# Patient Record
Sex: FEMALE | Race: OTHER | Employment: UNEMPLOYED | URBAN - METROPOLITAN AREA
[De-identification: names, ages, dates, MRNs, and addresses within clinical notes are randomized per-mention and may not be internally consistent; named-entity substitution may affect disease eponyms.]

---

## 2022-06-14 ENCOUNTER — LAB ENCOUNTER (OUTPATIENT)
Dept: LAB | Facility: HOSPITAL | Age: 22
End: 2022-06-14
Attending: NURSE PRACTITIONER
Payer: COMMERCIAL

## 2022-06-14 DIAGNOSIS — N94.6 DYSMENORRHEA: ICD-10-CM

## 2022-06-14 DIAGNOSIS — L68.0 FEMALE HIRSUTISM: ICD-10-CM

## 2022-06-14 LAB — TSI SER-ACNC: 2.45 MIU/ML (ref 0.36–3.74)

## 2022-06-14 PROCEDURE — 84403 ASSAY OF TOTAL TESTOSTERONE: CPT

## 2022-06-14 PROCEDURE — 84443 ASSAY THYROID STIM HORMONE: CPT

## 2022-06-14 PROCEDURE — 36415 COLL VENOUS BLD VENIPUNCTURE: CPT

## 2022-06-14 PROCEDURE — 84402 ASSAY OF FREE TESTOSTERONE: CPT

## 2022-06-18 LAB
TESTOSTERONE, FREE, S: 0.32 NG/DL
TESTOSTERONE, TOTAL, S: 23 NG/DL

## 2022-07-05 NOTE — PROCEDURES
IUD Insertion Procedure Note  TANYA Morgan    IUD Insertion:     Pregnancy Results: negative from urine test   Birth control method(s) used:  none  Pt's LMP was Patient's last menstrual period was 07/02/2022. Pt counseled on risks of IUD insertion including infection, PID/infertility, bleeding and uterine perforation. Discussed side effects of Kyleena IUD. Pt understands and has signed consent. Pelvic Exam Findings:  Cervix normal. Uterus anteverted. Procedure:  Speculum placed in the vagina. Betadine wash of vagina and cervix. Single tooth tenaculum was placed at the 12 o'clock position. Uterus sounded to cm. Cara Maggie IUD was placed without difficulty. Strings cut at 3 cm. Single tooth tenaculum removed. Silver nitrate used at tenaculum sites,hemostasis achieved. Patient tolerated procedure well. Visit Plan:  IUD surveillance was discussed with the patient. Pt to follow up in 4-6 weeks for IUD check.     TANYA Morgan  6:20 PM  7/5/2022

## 2022-08-03 ENCOUNTER — HOSPITAL ENCOUNTER (OUTPATIENT)
Dept: ULTRASOUND IMAGING | Facility: HOSPITAL | Age: 22
Discharge: HOME | End: 2022-08-03
Attending: NURSE PRACTITIONER
Payer: COMMERCIAL

## 2022-08-03 DIAGNOSIS — Z87.42 HISTORY OF OVARIAN CYST: ICD-10-CM

## 2022-08-03 PROCEDURE — 76856 US EXAM PELVIC COMPLETE: CPT | Performed by: NURSE PRACTITIONER

## 2022-08-03 PROCEDURE — 76830 TRANSVAGINAL US NON-OB: CPT | Performed by: NURSE PRACTITIONER

## 2022-12-06 ENCOUNTER — OFFICE VISIT (OUTPATIENT)
Dept: OBGYN CLINIC | Facility: CLINIC | Age: 22
End: 2022-12-06
Payer: COMMERCIAL

## 2022-12-06 VITALS
BODY MASS INDEX: 35.16 KG/M2 | SYSTOLIC BLOOD PRESSURE: 117 MMHG | WEIGHT: 211 LBS | DIASTOLIC BLOOD PRESSURE: 83 MMHG | HEIGHT: 65 IN

## 2022-12-06 DIAGNOSIS — Z30.431 IUD CHECK UP: Primary | ICD-10-CM

## 2022-12-06 RX ORDER — DIAZEPAM 5 MG/1
TABLET ORAL
COMMUNITY
Start: 2022-10-25

## 2024-02-08 ENCOUNTER — OFFICE VISIT (OUTPATIENT)
Dept: OBGYN CLINIC | Facility: CLINIC | Age: 24
End: 2024-02-08
Payer: COMMERCIAL

## 2024-02-08 VITALS
BODY MASS INDEX: 39.32 KG/M2 | WEIGHT: 236 LBS | SYSTOLIC BLOOD PRESSURE: 120 MMHG | DIASTOLIC BLOOD PRESSURE: 74 MMHG | HEIGHT: 65 IN

## 2024-02-08 DIAGNOSIS — Z30.431 SURVEILLANCE FOR BIRTH CONTROL, INTRAUTERINE DEVICE: ICD-10-CM

## 2024-02-08 DIAGNOSIS — Z11.3 SCREEN FOR STD (SEXUALLY TRANSMITTED DISEASE): ICD-10-CM

## 2024-02-08 DIAGNOSIS — Z01.419 ENCOUNTER FOR ANNUAL ROUTINE GYNECOLOGICAL EXAMINATION: Primary | ICD-10-CM

## 2024-02-08 PROCEDURE — 3008F BODY MASS INDEX DOCD: CPT | Performed by: OBSTETRICS & GYNECOLOGY

## 2024-02-08 PROCEDURE — 3078F DIAST BP <80 MM HG: CPT | Performed by: OBSTETRICS & GYNECOLOGY

## 2024-02-08 PROCEDURE — 3074F SYST BP LT 130 MM HG: CPT | Performed by: OBSTETRICS & GYNECOLOGY

## 2024-02-08 PROCEDURE — 87591 N.GONORRHOEAE DNA AMP PROB: CPT | Performed by: OBSTETRICS & GYNECOLOGY

## 2024-02-08 PROCEDURE — 87491 CHLMYD TRACH DNA AMP PROBE: CPT | Performed by: OBSTETRICS & GYNECOLOGY

## 2024-02-08 PROCEDURE — 99385 PREV VISIT NEW AGE 18-39: CPT | Performed by: OBSTETRICS & GYNECOLOGY

## 2024-02-08 RX ORDER — METFORMIN HYDROCHLORIDE 500 MG/1
500 TABLET, EXTENDED RELEASE ORAL
COMMUNITY
Start: 2023-12-08

## 2024-02-08 NOTE — PROGRESS NOTES
ANNUAL GYN EXAM  [facility]    CHIEF COMPLAINT:    Chief Complaint   Patient presents with    Annual      HISTORY OF PRESENT ILLNESS:   Deepa Díaz is a 23 year old female   who presents for annual well woman visit.  She is feeling well without complaints.    Had Kyleena placed . Gets menses, very light.    Has been evaluated for PCOS in the past, but this was not confirmed. Prior to IUD placement had monthly menses. Does have facial hair. Had normal ultrasound     May consider pregnancy in a couple years.   PAST GYNECOLOGICAL HISTORY & OTHER PREVENTIVE MEDICINE  LMP: Patient's last menstrual period was 2024.  Menarche: 9 (2024  8:45 AM)  Period Cycle (Days): 28-30 (2024  8:45 AM)  Period Duration (Days): 7d (2024  8:45 AM)  Period Flow: light (2024  8:45 AM)  Use of Birth Control (if yes, specify type): Kyleena IUD (2024  8:45 AM)  Date When Birth Control Last Used: kyleea iud inserted 2022 (2024  8:45 AM)  Hx Prior Abnormal Pap: No (2024  8:45 AM)  Pap Date: 22 (2024  8:45 AM)  Pap Result Notes: neg (2024  8:45 AM)    Complications: denies  Gravita/Parity:   Contraception: current -Kyleena; Previous -   Sexually transmitted disease history:None  Number of sexual partners: current sexual partners: 1, 1-2 yrs, Lifetime partners:   Pap history:  Last pap/result: NIL ; history abnormals: denies  Abuse history: denies  Vaginal discharge: denies  Bladder symptoms: denies    PAST MEDICAL HISTORY:   Past Medical History:   Diagnosis Date    Obese         PAST SURGICAL HISTORY:   History reviewed. No pertinent surgical history.     PAST OB HISTORY:  OB History    Para Term  AB Living   0 0 0 0 0 0   SAB IAB Ectopic Multiple Live Births   0 0 0 0 0       CURRENT MEDICATIONS:      Current Outpatient Medications:     metFORMIN  MG Oral Tablet 24 Hr, Take 1 tablet (500 mg total) by mouth daily with food., Disp: , Rfl:      diazePAM 5 MG Oral Tab, DO NOT TAKE UNTIL DIRECTED AT OFFICE (Patient not taking: Reported on 12/6/2022), Disp: , Rfl:     ALLERGIES:  Allergies   Allergen Reactions    Shrimp FACE FLUSHING, ITCHING, RASH, SHORTNESS OF BREATH, SWELLING and Tightness in Throat       SOCIAL HISTORY:  Social History     Socioeconomic History    Marital status:    Tobacco Use    Smoking status: Never    Smokeless tobacco: Never   Substance and Sexual Activity    Alcohol use: Not Currently    Drug use: Never    Sexual activity: Yes     Birth control/protection: I.U.D.   Other Topics Concern    Blood Transfusions No       FAMILY HISTORY:  Family History   Problem Relation Age of Onset    Diabetes Father     Diabetes Maternal Grandmother     Diabetes Maternal Grandfather     Diabetes Paternal Grandmother     Diabetes Paternal Grandfather     Uterine Cancer Neg     Ovarian Cancer Neg     Breast Cancer Neg      ASSESSMENTS:  REVIEW OF SYSTEMS:  CONSTITUTIONAL:  negative for fevers, chills and sweats    EYES:  negative for  blurred vision and visual disturbance  RESPIRATORY:  negative for  cough and shortness of breath  CARDIOVASCULAR:  negative for  chest pain, palpitations  GASTROINTESTINAL:  No constipation/diarrhea, no pain  GENITOURINARY:  See History of Present Illness  INTEGUMENT/BREAST: Breast: no masses, no nipple discharge  ENDOCRINE:  negative for acne, constipation, diarrhea, cold intolerance, heat intolerance, fatigue, hair loss, weight gain and weight loss  MUSCULOSKELETAL:  negative for joint pain  NEUROLOGICAL:  negative for dizziness/lightheadedness and headaches  BEHAVIOR/PSYCH:  Negative for depressed mood, anhedonia and anxiety    PHYSICAL EXAM  Patient's last menstrual period was 01/22/2024.   Vitals:    02/08/24 0844   BP: 120/74   Weight: 236 lb (107 kg)   Height: 65\"       CONSTITUTIONAL: Awake, alert, cooperative, no apparent distress, and appears stated age   NECK: Supple, symmetrical, trachea midline, no  adenopathy, thyroid symmetric, not enlarged and no tenderness  LUNGS: Clear to auscultation bilaterally, no crackles or wheezing  CARDIOVASCULAR: Regular rate and rhythm, normal S1 and S2, no murmur noted  ABDOMEN: Soft, non-distended, non-tender, no masses palpated    CHEST/BREASTS: Breasts symmetrical, skin without lesion(s), no nipple retraction or dimpling, no nipple discharge, no masses palpated, no axillary or supraclavicular adenopathy  GENITAL/URINARY:    External Genitalia:  General appearance; normal, Hair distribution; normal, Lesions absent   Urethral Meatus:  Lesions absent, Prolapse absent  Bladder:  Tenderness absent, Cystocele absent  Vagina:  Discharge absent, Lesions absent, Pelvic support normal  Cervix:  Lesions absent, Discharge absent, Tenderness absent; IUD strings seen   Uterus:  Size normal, Masses absent, Tenderness absent  Adnexa:  Masses absent, Tenderness absent  Anus/Perineum:  Lesions absent    MUSCULOSKELETAL: There is no redness, warmth, or swelling of the joints.  Full range of motion noted.  Motor strength is 5 out of 5 all extremities bilaterally.  Tone is normal.  NEUROLOGIC: Patient is awake, alert and oriented to name, place and time.  Casual gait is normal.  SKIN: no bruising or bleeding and no rashes  PSYCHIATRIC: Behavior:  Appropriate  Mood:  appropriate  ASSESSMENT AND PLAN:  1. Encounter for annual routine gynecological examination  Pap due 2025    2. Surveillance for birth control, intrauterine device  In situ    3. Screen for STD (sexually transmitted disease)    - Chlamydia/Gc Amplification; Future  - Chlamydia/Gc Amplification       Preventive Medicine in a 23 year old female  Health Maintenance Topics with due status: Overdue       Topic Date Due    Annual Physical Never done    COVID-19 Vaccine Never done    HPV Vaccines Never done    DTaP,Tdap,and Td Vaccines Never done    Influenza Vaccine Never done    Annual Depression Screening 01/01/2024        COUNSELING/EDUCATION PERFORMED:   Contraception.  Form chosen:  Kyleena  method use review  emergency contraception  Cervical Cancer Screening  Breast Cancer Screening - monthly self breast exam  Pre-sriram counseling and use of Folic Acid  Appropriate diet  Exercise  Safe sex/STD transmission/use of condoms  follow up 1 yr or as needed  Janelle Bustillos MD

## 2024-02-09 LAB
C TRACH DNA SPEC QL NAA+PROBE: NEGATIVE
N GONORRHOEA DNA SPEC QL NAA+PROBE: NEGATIVE

## 2024-08-27 ENCOUNTER — OFFICE VISIT (OUTPATIENT)
Dept: OBGYN CLINIC | Facility: CLINIC | Age: 24
End: 2024-08-27
Payer: COMMERCIAL

## 2024-08-27 VITALS
WEIGHT: 235 LBS | HEIGHT: 65 IN | BODY MASS INDEX: 39.15 KG/M2 | DIASTOLIC BLOOD PRESSURE: 72 MMHG | SYSTOLIC BLOOD PRESSURE: 122 MMHG

## 2024-08-27 DIAGNOSIS — Z30.432 ENCOUNTER FOR IUD REMOVAL: Primary | ICD-10-CM

## 2024-08-27 DIAGNOSIS — E66.9 CLASS 2 OBESITY WITH BODY MASS INDEX (BMI) OF 39.0 TO 39.9 IN ADULT, UNSPECIFIED OBESITY TYPE, UNSPECIFIED WHETHER SERIOUS COMORBIDITY PRESENT: ICD-10-CM

## 2024-08-27 DIAGNOSIS — R10.2 PELVIC PAIN: ICD-10-CM

## 2024-08-27 LAB
CONTROL LINE PRESENT WITH A CLEAR BACKGROUND (YES/NO): YES YES/NO
KIT LOT #: NORMAL NUMERIC
PREGNANCY TEST, URINE: NEGATIVE

## 2024-08-27 PROCEDURE — 3074F SYST BP LT 130 MM HG: CPT | Performed by: OBSTETRICS & GYNECOLOGY

## 2024-08-27 PROCEDURE — 3078F DIAST BP <80 MM HG: CPT | Performed by: OBSTETRICS & GYNECOLOGY

## 2024-08-27 PROCEDURE — 87491 CHLMYD TRACH DNA AMP PROBE: CPT | Performed by: OBSTETRICS & GYNECOLOGY

## 2024-08-27 PROCEDURE — 81025 URINE PREGNANCY TEST: CPT | Performed by: OBSTETRICS & GYNECOLOGY

## 2024-08-27 PROCEDURE — 87086 URINE CULTURE/COLONY COUNT: CPT | Performed by: OBSTETRICS & GYNECOLOGY

## 2024-08-27 PROCEDURE — 3008F BODY MASS INDEX DOCD: CPT | Performed by: OBSTETRICS & GYNECOLOGY

## 2024-08-27 PROCEDURE — 87591 N.GONORRHOEAE DNA AMP PROB: CPT | Performed by: OBSTETRICS & GYNECOLOGY

## 2024-08-27 PROCEDURE — 99213 OFFICE O/P EST LOW 20 MIN: CPT | Performed by: OBSTETRICS & GYNECOLOGY

## 2024-08-27 NOTE — PROGRESS NOTES
RETURN GYN OFFICE VISIT  [facility]    CHIEF COMPLAINT:    Chief Complaint   Patient presents with    Procedure     Iud removal        HISTORY OF PRESENT ILLNESS:    KB is a 24 year old female  here for c/o intermittent cramping pain daily usually 2/10, but several times /month /10. Cramping. Amenorrhea.   Believes it may be due to IUD. Does not need pain meds. Denies constipation/diarrhea    Contraception:  Kyleena  Last Pap Date: 2022 Result:  nilm      REVIEW OF SYSTEMS:   CONSTITUTIONAL:  negative for fevers, chills, sweats and fatigue  GASTROINTESTINAL:  negative for nausea, vomiting, blood in stool, constipation, diarrhea and abdominal pain  GENITOURINARY: negative for UTI sx  Negative for discharge   SKIN:  negative for  rash, skin lesion and pruritus  ENDOCRINE:  negative for acne, fatigue, weight gain and weight loss  BEHAVIOR/PSYCH:  negative for depressed mood, anhedonia and anxiety    CURRENT MEDICATIONS:    No current outpatient medications on file.    PAST MEDICAL, SOCIAL AND FAMILY HISTORY:    Past Medical History:    Obese     Past Surgical History:   Procedure Laterality Date    Lasik Bilateral      Family History   Problem Relation Age of Onset    Diabetes Father     Diabetes Maternal Grandmother     Diabetes Maternal Grandfather     Diabetes Paternal Grandmother     Diabetes Paternal Grandfather     Uterine Cancer Neg     Ovarian Cancer Neg     Breast Cancer Neg      Social History     Socioeconomic History    Marital status:    Tobacco Use    Smoking status: Never    Smokeless tobacco: Never   Substance and Sexual Activity    Alcohol use: Not Currently    Drug use: Never    Sexual activity: Yes     Partners: Male     Birth control/protection: I.U.D.   Other Topics Concern    Blood Transfusions No           PHYSICAL EXAM:   Patient's last menstrual period was 2024.; Body mass index is 39.11 kg/m².      CONSTITUTIONAL:  Awake, alert, cooperative, no apparent  distress  EYES: sclera clear and conjunctiva normal  GASTROINTESTINAL:  normal bowel sounds, soft, non-distended, non-tender, no masses palpated  GENITAL/URINARY:    External Genitalia:  General appearance; normal, Hair distribution; normal, Lesions absent   Urethral Meatus:  Lesions absent, Prolapse absent  Bladder:  Tenderness absent, Cystocele absent  Vagina:  Discharge absent, Lesions absent, Pelvic support normal  Cervix:  Lesions absent, Discharge absent, Tenderness absent  Uterus:  Size normal, Masses absent, Tenderness absent  Adnexa:  Masses absent, Tenderness absent  Anus/Perineum:  Lesions absent  SKIN:  No rashes  PSYCH:  Affect Normal      ASSESSMENT AND PLAN:    ICD-10-CM    1. Encounter for IUD removal  Z30.432 Urine Pregnancy Test     Urine Culture, Routine     CANCELED: REMOVE INTRAUTERINE DEVICE [04993]      2. Pelvic pain  R10.2 Pelvic US Complete GYNE Only [17218/66204]      3. Class 2 obesity with body mass index (BMI) of 39.0 to 39.9 in adult, unspecified obesity type, unspecified whether serious comorbidity present  E66.9 [facility] Location    Z68.39       States would like to lose weight prior to pregnancy attempts  Plan for ultrasound. If IUD is malpositioned, plan removal. If IUD is in good position, patient will decide timing of removal because she is considering pregnancy in near future.   Advised start prenatal vitamins.     Janelle Bustillos MD

## 2024-08-28 LAB
C TRACH DNA SPEC QL NAA+PROBE: NEGATIVE
N GONORRHOEA DNA SPEC QL NAA+PROBE: NEGATIVE

## 2024-08-30 ENCOUNTER — ULTRASOUND ENCOUNTER (OUTPATIENT)
Dept: OBGYN CLINIC | Facility: CLINIC | Age: 24
End: 2024-08-30
Payer: COMMERCIAL

## 2024-08-30 DIAGNOSIS — R10.2 PELVIC PAIN: ICD-10-CM

## (undated) NOTE — LETTER
AUTHORIZATION FOR SURGICAL OPERATION OR OTHER PROCEDURE    1. I hereby authorize Sheryle Glow , and [employer] [facility] staff assigned to my case to perform the following operation and/or procedure at the [place] [facility] [place]:    _______________________________________________________________________________________________      _______________________________________________________________________________________________    2. My physician has explained the nature and purpose of the operation or other procedure, possible alternative methods of treatment, the risks involved, and the possibility of complication to me. I acknowledge that no guarantee has been made as to the result that may be obtained. 3.  I recognize that, during the course of this operation, or other procedure, unforseen conditions may necessitate additional or different procedure than those listed above. I, therefore, further authorize and request that the above named physician, his/her physician assistants or designees perform such procedures as are, in his/her professional opinion, necessary and desirable. 4.  Any tissue or organs removed in the operation or other procedure may be disposed of by and at the discretion of the [place] [facility] and [facility]. 5.  I understand that in the event of a medical emergency, I will be transported by local paramedics to [facility] or other hospital emergency department. 6.  I certify that I have read and fully understand the above consent to operation and/or other procedure. 7.  I acknowledge that my physician has explained sedation/analgesia administration to me including the risks and benefits. I consent to the administration of sedation/analgesia as may be necessary or desirable in the judgement of my physician. Witness signature: ___________________________________________________ Date:  ______/______/_____                    Time:  ________ A. M.  P.M. Patient Name:  ______________________________________________________  (please print)      Patient signature:  ___________________________________________________             Relationship to Patient:           []  Parent    Responsible person                          []  Spouse  In case of minor or                    [] Other  _____________   Incompetent name:  __________________________________________________                               (please print)      _____________      Responsible person  In case of minor or  Incompetent signature:  _______________________________________________    Statement of Physician  My signature below affirms that prior to the time of the procedure, I have explained to the patient and/or his/her guardian, the risks and benefits involved in the proposed treatment and any reasonable alternative to the proposed treatment. I have also explained the risks and benefits involved in the refusal of the proposed treatment and have answered the patient's questions.                         Date:  ______/______/_______  Provider                      Signature:  __________________________________________________________       Time:  ___________ A.M    P.M.

## (undated) NOTE — LETTER
Deepa KAMRAN Geearte, :3/16/2000    CONSENT FOR PROCEDURE/SEDATION    1. I authorize the performance upon Deepa Díaz  the following: Intrauterine Device Removal    2. I authorize Dr. Janelle Bustillos MD (and whomever is designated as the doctor’s assistant), to perform the above-mentioned procedures.    3. If any unforeseen conditions arise during this procedure calling for additional  procedures, operations, or medications (including anesthesia and blood transfusion), I further request and authorize the doctor to do whatever he/she deems advisable in my interest.    4. I consent to the taking and reproduction of any photographs in the course of this procedure for professional purposes.    5. I consent to the administration of such sedation as may be considered necessary or advisable by the physician responsible for this service, with the exception of ______________________________________________________    6. I have been informed by my doctor of the nature and purpose of this procedure sedation, possible alternative methods of treatment, risk involved and possible complications.      Signature of Patient:_______________________________________________    Signature of person authorized to consent for patient:  _______________________________________________________________    Relationship to patient: ____________________________________________    Witness: _________________________________________ Date:___________     Physician Signature: _______________________________ Date:___________

## (undated) NOTE — MR AVS SNAPSHOT
After Visit Summary   6/14/2022    Rhett Fragoso   MRN: [id number]           Visit Information     Date & Time  6/14/2022  5:30 PM Provider  Johnathon Moon [address] [address] [facility] Phone  [phone] [facility]  Most recent update: 6/14/2022  5:38 PM    BP   112/76    Wt   182 lb 9.6 oz    LMP   06/03/2022 (Exact Date)           Allergies as of 6/14/2022  Review status set to Review Complete on 6/14/2022   No Known Allergies     Diagnoses for This Visit    Well woman exam with routine gynecological exam   [388360]  -  Primary  Female hirsutism   [905804]    Dysmenorrhea   [625. 3. ICD-9-CM]    History of ovarian cyst   [142714]    Papanicolaou smear for cervical cancer screening   [476899]             Follow-up    Return in about 3 weeks (around 7/5/2022) for IUD insertion. We Ordered the Following     Normal Orders This Visit    THINPREP PAP WITH HPV REFLEX REQUEST B [[id number] CUSTOM]     THINPREP PAP WITH HPV REFLEX REQUEST [[id number] CUSTOM]     Future Labs/Procedures Expected by Expires    ASSAY, THYROID STIM HORMONE [[id number] CUSTOM]  6/14/2022 (Approximate) 6/14/2023    TESTOSTERONE,FREE AND TOTAL [[id number] CUSTOM]  6/14/2022 (Approximate) 6/14/2023    THINPREP PAP WITH HPV REFLEX REQUEST B [[id number] CUSTOM]  6/14/2022 6/14/2023    US PELVIS (TRANSABDOMINAL AND TRANSVAGINAL) (CPT=76856/19042) [41113 CPT(R)]  6/14/2022 (Approximate) 6/14/2023      Future Appointments        Provider Department    [facility]       Follow-up Instructions    Return in about 3 weeks (around 7/5/2022) for IUD insertion. Imaging Scheduling Instructions     Around June 14, 2022   Imaging:   US PELVIS (TRANSABDOMINAL AND TRANSVAGINAL) (CRZ=40989/07110)    Instructions: To schedule an appointment for your radiology test please call Sophie Seaman [facility] Scheduling at [phone].                     Did you know that [facility] primary care physicians now offer Video Visits through [address] for adult patients for a variety of conditions such as allergies, back pain and cold symptoms? Skip the drive and waiting room and online chat with a doctor face-to-face using your web-cam enabled computer or mobile device wherever you are. Video Visits cost $50 and can be paid hassle-free using a credit, debit, or health savings card. Not active on [employer]? Ask us how to get signed up today! If you receive a survey from [employer], please take a few minutes to complete it and provide feedback. We strive to deliver the best patient experience and are looking for ways to make improvements. Your feedback will help us do so. For more information on [employer], please visit [email or web address]           No text in SmartText           No text in SmartText